# Patient Record
Sex: MALE | Race: AMERICAN INDIAN OR ALASKA NATIVE
[De-identification: names, ages, dates, MRNs, and addresses within clinical notes are randomized per-mention and may not be internally consistent; named-entity substitution may affect disease eponyms.]

---

## 2018-09-24 ENCOUNTER — HOSPITAL ENCOUNTER (EMERGENCY)
Dept: HOSPITAL 5 - ED | Age: 43
Discharge: HOME | End: 2018-09-24
Payer: COMMERCIAL

## 2018-09-24 VITALS — SYSTOLIC BLOOD PRESSURE: 132 MMHG | DIASTOLIC BLOOD PRESSURE: 88 MMHG

## 2018-09-24 DIAGNOSIS — S01.81XA: ICD-10-CM
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
## 2018-09-24 DIAGNOSIS — M54.2: ICD-10-CM

## 2018-09-24 DIAGNOSIS — S09.90XA: Primary | ICD-10-CM
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
## 2018-09-24 DIAGNOSIS — H11.32: ICD-10-CM

## 2018-09-24 PROCEDURE — 90471 IMMUNIZATION ADMIN: CPT

## 2018-09-24 PROCEDURE — 70486 CT MAXILLOFACIAL W/O DYE: CPT

## 2018-09-24 PROCEDURE — 90715 TDAP VACCINE 7 YRS/> IM: CPT

## 2018-09-24 PROCEDURE — 72125 CT NECK SPINE W/O DYE: CPT

## 2018-09-24 PROCEDURE — 70450 CT HEAD/BRAIN W/O DYE: CPT

## 2018-09-24 NOTE — CAT SCAN REPORT
FINAL REPORT



PROCEDURE:  CT CERVICAL SPINE WO CON



TECHNIQUE:  Computerized tomography of the cervical spine was

performed from the skull base to T1 without contrast material. 



HISTORY:  Fall/ head and facial injuries 



COMPARISON:  No prior studies are available for comparison.



FINDINGS:  

There are no fractures or malalignments. 



C1-2: No significant abnormality.



C2-3: No significant abnormality.



C3-4: No significant abnormality.



C4-5: No significant abnormality.



C5-6: No significant abnormality.



C6-7: No significant abnormality.



C7-T1: No significant abnormality.



Other: There is no prevertebral soft tissue swelling..



IMPRESSION:  

No significant abnormality.

## 2018-09-24 NOTE — CAT SCAN REPORT
FINAL REPORT



PROCEDURE:  CT HEAD/BRAIN WO CON



TECHNIQUE:  Computerized tomography of the head was performed

without contrast material. 



HISTORY:  Fall/ head and facial injuries 



COMPARISON:  No prior studies are available for comparison.



FINDINGS:  

Skull and scalp: There is frontal scalp swelling. There is no

fracture..



Paranasal sinuses: Normal.



Ventricles and subarachnoid spaces: Normal.



Cerebrum: No evidence of hemorrhage, acute infarction or mass .



Cerebellum and brainstem: No evidence of hemorrhage, acute

infarction or mass.



Vasculature: Normal.



Comments: None.



IMPRESSION:  

There is frontal scalp swelling. There is no skull fracture.

There is no intracranial hemorrhage.

## 2018-09-24 NOTE — CAT SCAN REPORT
FINAL REPORT



PROCEDURE:  CT FACIAL BONES WO CON



TECHNIQUE:  Computerized tomography of the facial bones and soft

tissues with axial and coronal sections performed from the

cranial aspect of the frontal sinuses to the caudal portion of

the mandible without contrast material. 



HISTORY:  Fall/ head and facial injuries 



COMPARISON:  No prior studies are available for comparison.



FINDINGS:  

Bones: No significant abnormality.



Paranasal sinuses: Clear.



Soft tissues: There is frontal and perinasal soft tissue

swelling. There is subcutaneous air in the paranasal soft

tissues..



Other: There is bilateral proptosis..



IMPRESSION:  

No fractures are identified.
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
